# Patient Record
Sex: MALE | Race: WHITE
[De-identification: names, ages, dates, MRNs, and addresses within clinical notes are randomized per-mention and may not be internally consistent; named-entity substitution may affect disease eponyms.]

---

## 2022-01-27 ENCOUNTER — HOSPITAL ENCOUNTER (EMERGENCY)
Dept: HOSPITAL 54 - ER | Age: 52
LOS: 1 days | Discharge: HOME | End: 2022-01-28
Payer: SELF-PAY

## 2022-01-27 VITALS — BODY MASS INDEX: 26.52 KG/M2 | WEIGHT: 165 LBS | HEIGHT: 66 IN

## 2022-01-27 DIAGNOSIS — Z60.2: ICD-10-CM

## 2022-01-27 DIAGNOSIS — F10.129: Primary | ICD-10-CM

## 2022-01-27 DIAGNOSIS — R94.31: ICD-10-CM
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
## 2022-01-27 LAB
ALBUMIN SERPL BCP-MCNC: 3.9 G/DL (ref 3.4–5)
ALP SERPL-CCNC: 86 U/L (ref 46–116)
ALT SERPL W P-5'-P-CCNC: 26 U/L (ref 12–78)
APAP SERPL-MCNC: < 3 UG/ML (ref 10–30)
AST SERPL W P-5'-P-CCNC: 26 U/L (ref 15–37)
BASOPHILS # BLD AUTO: 0 K/UL (ref 0–0.2)
BASOPHILS NFR BLD AUTO: 0.7 % (ref 0–2)
BILIRUB DIRECT SERPL-MCNC: 0.1 MG/DL (ref 0–0.2)
BILIRUB SERPL-MCNC: 0.2 MG/DL (ref 0.2–1)
BILIRUB UR QL STRIP: NEGATIVE
BUN SERPL-MCNC: 4 MG/DL (ref 7–18)
CALCIUM SERPL-MCNC: 8.1 MG/DL (ref 8.5–10.1)
CHLORIDE SERPL-SCNC: 109 MMOL/L (ref 98–107)
CO2 SERPL-SCNC: 28 MMOL/L (ref 21–32)
COLOR UR: (no result)
CREAT SERPL-MCNC: 0.9 MG/DL (ref 0.6–1.3)
EOSINOPHIL NFR BLD AUTO: 1 % (ref 0–6)
ETHANOL SERPL-MCNC: 451 MG/DL (ref 0–0)
GLUCOSE SERPL-MCNC: 98 MG/DL (ref 74–106)
GLUCOSE UR STRIP-MCNC: NEGATIVE MG/DL
HCT VFR BLD AUTO: 48 % (ref 39–51)
HGB BLD-MCNC: 16.2 G/DL (ref 13.5–17.5)
LEUKOCYTE ESTERASE UR QL STRIP: NEGATIVE
LYMPHOCYTES NFR BLD AUTO: 1.9 K/UL (ref 0.8–4.8)
LYMPHOCYTES NFR BLD AUTO: 39.7 % (ref 20–44)
MCHC RBC AUTO-ENTMCNC: 34 G/DL (ref 31–36)
MCV RBC AUTO: 98 FL (ref 80–96)
MONOCYTES NFR BLD AUTO: 0.3 K/UL (ref 0.1–1.3)
MONOCYTES NFR BLD AUTO: 5.1 % (ref 2–12)
NEUTROPHILS # BLD AUTO: 2.6 K/UL (ref 1.8–8.9)
NEUTROPHILS NFR BLD AUTO: 53.5 % (ref 43–81)
NITRITE UR QL STRIP: NEGATIVE
PH UR STRIP: 6 [PH] (ref 5–8)
PLATELET # BLD AUTO: 317 K/UL (ref 150–450)
POTASSIUM SERPL-SCNC: 3.5 MMOL/L (ref 3.5–5.1)
PROT SERPL-MCNC: 7.9 G/DL (ref 6.4–8.2)
PROT UR QL STRIP: NEGATIVE MG/DL
RBC # BLD AUTO: 4.86 MIL/UL (ref 4.5–6)
SODIUM SERPL-SCNC: 145 MMOL/L (ref 136–145)
UROBILINOGEN UR STRIP-MCNC: 0.2 EU/DL
WBC NRBC COR # BLD AUTO: 4.9 K/UL (ref 4.3–11)

## 2022-01-27 PROCEDURE — 36415 COLL VENOUS BLD VENIPUNCTURE: CPT

## 2022-01-27 PROCEDURE — 80307 DRUG TEST PRSMV CHEM ANLYZR: CPT

## 2022-01-27 PROCEDURE — 72125 CT NECK SPINE W/O DYE: CPT

## 2022-01-27 PROCEDURE — 85025 COMPLETE CBC W/AUTO DIFF WBC: CPT

## 2022-01-27 PROCEDURE — 84484 ASSAY OF TROPONIN QUANT: CPT

## 2022-01-27 PROCEDURE — 80143 DRUG ASSAY ACETAMINOPHEN: CPT

## 2022-01-27 PROCEDURE — 81003 URINALYSIS AUTO W/O SCOPE: CPT

## 2022-01-27 PROCEDURE — 80076 HEPATIC FUNCTION PANEL: CPT

## 2022-01-27 PROCEDURE — 93005 ELECTROCARDIOGRAM TRACING: CPT

## 2022-01-27 PROCEDURE — 96360 HYDRATION IV INFUSION INIT: CPT

## 2022-01-27 PROCEDURE — 82962 GLUCOSE BLOOD TEST: CPT

## 2022-01-27 PROCEDURE — 80320 DRUG SCREEN QUANTALCOHOLS: CPT

## 2022-01-27 PROCEDURE — 70450 CT HEAD/BRAIN W/O DYE: CPT

## 2022-01-27 PROCEDURE — G0480 DRUG TEST DEF 1-7 CLASSES: HCPCS

## 2022-01-27 PROCEDURE — L0172 CERV COL SR FOAM 2PC PRE OTS: HCPCS

## 2022-01-27 PROCEDURE — 99285 EMERGENCY DEPT VISIT HI MDM: CPT

## 2022-01-27 PROCEDURE — 80048 BASIC METABOLIC PNL TOTAL CA: CPT

## 2022-01-27 SDOH — SOCIAL STABILITY - SOCIAL INSECURITY: PROBLEMS RELATED TO LIVING ALONE: Z60.2

## 2022-01-27 NOTE — NUR
ATTEMPTED TO COMPLETE EKG FOR PATIENT. PATIENT WAS NON-COOPERATIVE AND REFUSED 
EKG EXAM. MULTIPLE ATTEMPTS WERE MADE TO EXPLAIN TO PT THAT THE EKG WAS ORDERED 
BY THE PHYSICIAN. PT ADAMANTLY REFUSED. CINDI ENRIQUEZ NOTIFIED.

## 2022-01-27 NOTE — NUR
MENARA 39 C/O ETOH FROM [facility]. PT DROWSY BUT RESPONSIVE TO TOUCH. 
TOLERATING R/A AT 98%

## 2022-01-28 VITALS — DIASTOLIC BLOOD PRESSURE: 61 MMHG | SYSTOLIC BLOOD PRESSURE: 123 MMHG

## 2022-02-03 NOTE — NUR
REQUESTING CLEARANCE BACK TO WORK. SEEN LAST 01/27 FOR ETOH INTOXICATION. 
VITALS ARE WITHIN NORMAL LIMITS.